# Patient Record
Sex: MALE | Race: WHITE | NOT HISPANIC OR LATINO
[De-identification: names, ages, dates, MRNs, and addresses within clinical notes are randomized per-mention and may not be internally consistent; named-entity substitution may affect disease eponyms.]

---

## 2019-10-08 ENCOUNTER — APPOINTMENT (OUTPATIENT)
Dept: ULTRASOUND IMAGING | Facility: CLINIC | Age: 44
End: 2019-10-08
Payer: COMMERCIAL

## 2019-10-08 ENCOUNTER — OUTPATIENT (OUTPATIENT)
Dept: OUTPATIENT SERVICES | Facility: HOSPITAL | Age: 44
LOS: 1 days | End: 2019-10-08
Payer: COMMERCIAL

## 2019-10-08 DIAGNOSIS — Z00.8 ENCOUNTER FOR OTHER GENERAL EXAMINATION: ICD-10-CM

## 2019-10-08 PROCEDURE — 76536 US EXAM OF HEAD AND NECK: CPT | Mod: 26

## 2019-10-08 PROCEDURE — 76775 US EXAM ABDO BACK WALL LIM: CPT | Mod: 26

## 2019-10-08 PROCEDURE — 76536 US EXAM OF HEAD AND NECK: CPT

## 2019-10-08 PROCEDURE — 76775 US EXAM ABDO BACK WALL LIM: CPT

## 2020-01-19 ENCOUNTER — EMERGENCY (EMERGENCY)
Facility: HOSPITAL | Age: 45
LOS: 1 days | Discharge: HOME | End: 2020-01-19
Attending: STUDENT IN AN ORGANIZED HEALTH CARE EDUCATION/TRAINING PROGRAM
Payer: COMMERCIAL

## 2020-01-19 VITALS
WEIGHT: 315 LBS | HEIGHT: 72 IN | SYSTOLIC BLOOD PRESSURE: 106 MMHG | HEART RATE: 75 BPM | TEMPERATURE: 98 F | DIASTOLIC BLOOD PRESSURE: 67 MMHG | RESPIRATION RATE: 20 BRPM | OXYGEN SATURATION: 100 %

## 2020-01-19 PROCEDURE — 99284 EMERGENCY DEPT VISIT MOD MDM: CPT

## 2020-01-19 NOTE — ED ADULT TRIAGE NOTE - CHIEF COMPLAINT QUOTE
patient states that he has been having SOB for a few days getting worse over the last 2 days can not lie down can not get comfortably, and diarrhea, patient speaking in full sentences in no distress lungs clear to auscultation

## 2020-01-20 VITALS
SYSTOLIC BLOOD PRESSURE: 103 MMHG | TEMPERATURE: 99 F | HEART RATE: 75 BPM | DIASTOLIC BLOOD PRESSURE: 57 MMHG | RESPIRATION RATE: 20 BRPM | OXYGEN SATURATION: 98 %

## 2020-01-20 LAB
ALBUMIN SERPL ELPH-MCNC: 4 G/DL — SIGNIFICANT CHANGE UP (ref 3.3–5.2)
ALP SERPL-CCNC: 50 U/L — SIGNIFICANT CHANGE UP (ref 40–120)
ALT FLD-CCNC: 25 U/L — SIGNIFICANT CHANGE UP
ANION GAP SERPL CALC-SCNC: 12 MMOL/L — SIGNIFICANT CHANGE UP (ref 5–17)
AST SERPL-CCNC: 21 U/L — SIGNIFICANT CHANGE UP
BILIRUB SERPL-MCNC: <0.2 MG/DL — LOW (ref 0.4–2)
BUN SERPL-MCNC: 13 MG/DL — SIGNIFICANT CHANGE UP (ref 8–20)
CALCIUM SERPL-MCNC: 9 MG/DL — SIGNIFICANT CHANGE UP (ref 8.6–10.2)
CHLORIDE SERPL-SCNC: 104 MMOL/L — SIGNIFICANT CHANGE UP (ref 98–107)
CO2 SERPL-SCNC: 23 MMOL/L — SIGNIFICANT CHANGE UP (ref 22–29)
CREAT SERPL-MCNC: 0.83 MG/DL — SIGNIFICANT CHANGE UP (ref 0.5–1.3)
D DIMER BLD IA.RAPID-MCNC: <150 NG/ML DDU — SIGNIFICANT CHANGE UP
GLUCOSE SERPL-MCNC: 115 MG/DL — SIGNIFICANT CHANGE UP (ref 70–115)
HCT VFR BLD CALC: 45.1 % — SIGNIFICANT CHANGE UP (ref 39–50)
HGB BLD-MCNC: 14.7 G/DL — SIGNIFICANT CHANGE UP (ref 13–17)
MCHC RBC-ENTMCNC: 27.1 PG — SIGNIFICANT CHANGE UP (ref 27–34)
MCHC RBC-ENTMCNC: 32.6 GM/DL — SIGNIFICANT CHANGE UP (ref 32–36)
MCV RBC AUTO: 83.1 FL — SIGNIFICANT CHANGE UP (ref 80–100)
NT-PROBNP SERPL-SCNC: 23 PG/ML — SIGNIFICANT CHANGE UP (ref 0–300)
PLATELET # BLD AUTO: 330 K/UL — SIGNIFICANT CHANGE UP (ref 150–400)
POTASSIUM SERPL-MCNC: 3.9 MMOL/L — SIGNIFICANT CHANGE UP (ref 3.5–5.3)
POTASSIUM SERPL-SCNC: 3.9 MMOL/L — SIGNIFICANT CHANGE UP (ref 3.5–5.3)
PROT SERPL-MCNC: 7.1 G/DL — SIGNIFICANT CHANGE UP (ref 6.6–8.7)
RBC # BLD: 5.43 M/UL — SIGNIFICANT CHANGE UP (ref 4.2–5.8)
RBC # FLD: 13.5 % — SIGNIFICANT CHANGE UP (ref 10.3–14.5)
SODIUM SERPL-SCNC: 139 MMOL/L — SIGNIFICANT CHANGE UP (ref 135–145)
TROPONIN T SERPL-MCNC: <0.01 NG/ML — SIGNIFICANT CHANGE UP (ref 0–0.06)
WBC # BLD: 8.62 K/UL — SIGNIFICANT CHANGE UP (ref 3.8–10.5)
WBC # FLD AUTO: 8.62 K/UL — SIGNIFICANT CHANGE UP (ref 3.8–10.5)

## 2020-01-20 PROCEDURE — 99284 EMERGENCY DEPT VISIT MOD MDM: CPT

## 2020-01-20 PROCEDURE — 36415 COLL VENOUS BLD VENIPUNCTURE: CPT

## 2020-01-20 PROCEDURE — 85379 FIBRIN DEGRADATION QUANT: CPT

## 2020-01-20 PROCEDURE — 84484 ASSAY OF TROPONIN QUANT: CPT

## 2020-01-20 PROCEDURE — 71046 X-RAY EXAM CHEST 2 VIEWS: CPT | Mod: 26

## 2020-01-20 PROCEDURE — 80053 COMPREHEN METABOLIC PANEL: CPT

## 2020-01-20 PROCEDURE — 93010 ELECTROCARDIOGRAM REPORT: CPT

## 2020-01-20 PROCEDURE — 83880 ASSAY OF NATRIURETIC PEPTIDE: CPT

## 2020-01-20 PROCEDURE — 85027 COMPLETE CBC AUTOMATED: CPT

## 2020-01-20 PROCEDURE — 71046 X-RAY EXAM CHEST 2 VIEWS: CPT

## 2020-01-20 PROCEDURE — 93005 ELECTROCARDIOGRAM TRACING: CPT

## 2020-01-20 RX ORDER — SODIUM CHLORIDE 9 MG/ML
1000 INJECTION INTRAMUSCULAR; INTRAVENOUS; SUBCUTANEOUS ONCE
Refills: 0 | Status: COMPLETED | OUTPATIENT
Start: 2020-01-20 | End: 2020-01-20

## 2020-01-20 RX ADMIN — SODIUM CHLORIDE 1000 MILLILITER(S): 9 INJECTION INTRAMUSCULAR; INTRAVENOUS; SUBCUTANEOUS at 01:45

## 2020-01-20 NOTE — ED PROVIDER NOTE - OBJECTIVE STATEMENT
43 y/o M pt, with PMHx of sleep apnea (uses CPAP) and DM, presents to the ED c/o difficulty breathing that began 1 week ago, with worse episode tonight. Pt reports an episode of difficulty breathing and lightheadedness when trying to sleep tonight, describes sensation as "breathing through a straw." He locates the feeling of overall discomfort in his chest and throat, which is worse when laying down. Pt states that since his father passed away 12/4/2018 from CVA, he has been dealing with on and off generalized feelings of discomfort. He notes that the following January, he felt worst, describing abd discomfort and CP, prompting him to go to [facility] ED, but was d/c with no significant Dx and a negative evaluation. Pt has visited multiple ED since then, but on his most recent visit, he was told he may have esophageal spasms. He states that he received an endoscopy, colonoscopy last 11/2019, checked his gallbladder function, and echocardiogram 1 week ago, all of which were negative. He thought his on and off symptoms were associated with depression and anxiety since the death of his father. However, now suspects possible other pathology, noting that his symptoms seem to worsen when his diet is full of "junk" food, but improves with a "" diet. Pt states that this most recent episode is different from those in the past. Notes occasional calf pain, describes sensation more as muscle fatigue, and weight gain over the past year. Denies coughing, recent sick contacts, or Hx of thyroid issues. Pt's Flu vaccine is UTD.

## 2020-01-20 NOTE — ED ADULT NURSE NOTE - OBJECTIVE STATEMENT
pt presents to the ed a&ox3 c/o sob that began 5 days ago, pt denies any cp or discomfort. currenlty denies any s/s of acute distress. pt safety maintained, pt on cardiac monitor, seen and eval by md richard.

## 2020-01-20 NOTE — ED PROVIDER NOTE - PATIENT PORTAL LINK FT
The patient is a 55y Male complaining of
You can access the FollowMyHealth Patient Portal offered by [facility] by registering at the following website: [email or web address]. By joining [employer]’s FollowMyHealth portal, you will also be able to view your health information using other applications (apps) compatible with our system.

## 2020-01-20 NOTE — ED PROVIDER NOTE - CLINICAL SUMMARY MEDICAL DECISION MAKING FREE TEXT BOX
Will evaluate symptoms; unclear if secondary to acute infectious process, weight, or versus other underlying issues.

## 2020-01-20 NOTE — ED PROVIDER NOTE - PROGRESS NOTE DETAILS
Pt stable, X-ray and labs noted to be normal, and will contact pt if Flu positive. Otherwise, pt is stable for discharge with outpatient followup. Patient states he feels significant better after IV hydration.

## 2020-02-25 ENCOUNTER — EMERGENCY (EMERGENCY)
Facility: HOSPITAL | Age: 45
LOS: 1 days | Discharge: HOME | End: 2020-02-25
Attending: EMERGENCY MEDICINE
Payer: COMMERCIAL

## 2020-02-25 VITALS
SYSTOLIC BLOOD PRESSURE: 131 MMHG | HEART RATE: 72 BPM | TEMPERATURE: 97 F | WEIGHT: 315 LBS | OXYGEN SATURATION: 98 % | RESPIRATION RATE: 18 BRPM | HEIGHT: 72 IN | DIASTOLIC BLOOD PRESSURE: 82 MMHG

## 2020-02-25 VITALS
OXYGEN SATURATION: 99 % | SYSTOLIC BLOOD PRESSURE: 120 MMHG | RESPIRATION RATE: 20 BRPM | DIASTOLIC BLOOD PRESSURE: 66 MMHG | HEART RATE: 72 BPM | TEMPERATURE: 98 F

## 2020-02-25 PROBLEM — Z00.00 ENCOUNTER FOR PREVENTIVE HEALTH EXAMINATION: Status: ACTIVE | Noted: 2020-02-25

## 2020-02-25 LAB
ALBUMIN SERPL ELPH-MCNC: 4.2 G/DL — SIGNIFICANT CHANGE UP (ref 3.3–5.2)
ALP SERPL-CCNC: 56 U/L — SIGNIFICANT CHANGE UP (ref 40–120)
ALT FLD-CCNC: 19 U/L — SIGNIFICANT CHANGE UP
ANION GAP SERPL CALC-SCNC: 11 MMOL/L — SIGNIFICANT CHANGE UP (ref 5–17)
AST SERPL-CCNC: 17 U/L — SIGNIFICANT CHANGE UP
BILIRUB SERPL-MCNC: 0.6 MG/DL — SIGNIFICANT CHANGE UP (ref 0.4–2)
BUN SERPL-MCNC: 14 MG/DL — SIGNIFICANT CHANGE UP (ref 8–20)
CALCIUM SERPL-MCNC: 9.2 MG/DL — SIGNIFICANT CHANGE UP (ref 8.6–10.2)
CHLORIDE SERPL-SCNC: 102 MMOL/L — SIGNIFICANT CHANGE UP (ref 98–107)
CO2 SERPL-SCNC: 26 MMOL/L — SIGNIFICANT CHANGE UP (ref 22–29)
CREAT SERPL-MCNC: 0.85 MG/DL — SIGNIFICANT CHANGE UP (ref 0.5–1.3)
D DIMER BLD IA.RAPID-MCNC: <150 NG/ML DDU — SIGNIFICANT CHANGE UP
GLUCOSE SERPL-MCNC: 103 MG/DL — HIGH (ref 70–99)
HCT VFR BLD CALC: 45 % — SIGNIFICANT CHANGE UP (ref 39–50)
HGB BLD-MCNC: 14.3 G/DL — SIGNIFICANT CHANGE UP (ref 13–17)
MCHC RBC-ENTMCNC: 26.3 PG — LOW (ref 27–34)
MCHC RBC-ENTMCNC: 31.8 GM/DL — LOW (ref 32–36)
MCV RBC AUTO: 82.9 FL — SIGNIFICANT CHANGE UP (ref 80–100)
PLATELET # BLD AUTO: 345 K/UL — SIGNIFICANT CHANGE UP (ref 150–400)
POTASSIUM SERPL-MCNC: 4.2 MMOL/L — SIGNIFICANT CHANGE UP (ref 3.5–5.3)
POTASSIUM SERPL-SCNC: 4.2 MMOL/L — SIGNIFICANT CHANGE UP (ref 3.5–5.3)
PROT SERPL-MCNC: 7.5 G/DL — SIGNIFICANT CHANGE UP (ref 6.6–8.7)
RBC # BLD: 5.43 M/UL — SIGNIFICANT CHANGE UP (ref 4.2–5.8)
RBC # FLD: 13.2 % — SIGNIFICANT CHANGE UP (ref 10.3–14.5)
SODIUM SERPL-SCNC: 139 MMOL/L — SIGNIFICANT CHANGE UP (ref 135–145)
TROPONIN T SERPL-MCNC: <0.01 NG/ML — SIGNIFICANT CHANGE UP (ref 0–0.06)
WBC # BLD: 9.24 K/UL — SIGNIFICANT CHANGE UP (ref 3.8–10.5)
WBC # FLD AUTO: 9.24 K/UL — SIGNIFICANT CHANGE UP (ref 3.8–10.5)

## 2020-02-25 PROCEDURE — 85379 FIBRIN DEGRADATION QUANT: CPT

## 2020-02-25 PROCEDURE — 71046 X-RAY EXAM CHEST 2 VIEWS: CPT | Mod: 26

## 2020-02-25 PROCEDURE — 93010 ELECTROCARDIOGRAM REPORT: CPT

## 2020-02-25 PROCEDURE — 36415 COLL VENOUS BLD VENIPUNCTURE: CPT

## 2020-02-25 PROCEDURE — 80053 COMPREHEN METABOLIC PANEL: CPT

## 2020-02-25 PROCEDURE — 84484 ASSAY OF TROPONIN QUANT: CPT

## 2020-02-25 PROCEDURE — 93005 ELECTROCARDIOGRAM TRACING: CPT

## 2020-02-25 PROCEDURE — 85027 COMPLETE CBC AUTOMATED: CPT

## 2020-02-25 PROCEDURE — 99284 EMERGENCY DEPT VISIT MOD MDM: CPT | Mod: 25

## 2020-02-25 PROCEDURE — 71046 X-RAY EXAM CHEST 2 VIEWS: CPT

## 2020-02-25 PROCEDURE — 99285 EMERGENCY DEPT VISIT HI MDM: CPT

## 2020-02-25 RX ORDER — OMEPRAZOLE 10 MG/1
1 CAPSULE, DELAYED RELEASE ORAL
Qty: 0 | Refills: 0 | DISCHARGE

## 2020-02-25 RX ORDER — SODIUM CHLORIDE 9 MG/ML
1000 INJECTION INTRAMUSCULAR; INTRAVENOUS; SUBCUTANEOUS ONCE
Refills: 0 | Status: COMPLETED | OUTPATIENT
Start: 2020-02-25 | End: 2020-02-25

## 2020-02-25 RX ADMIN — SODIUM CHLORIDE 1000 MILLILITER(S): 9 INJECTION INTRAMUSCULAR; INTRAVENOUS; SUBCUTANEOUS at 09:11

## 2020-02-25 RX ADMIN — SODIUM CHLORIDE 1000 MILLILITER(S): 9 INJECTION INTRAMUSCULAR; INTRAVENOUS; SUBCUTANEOUS at 09:24

## 2020-02-25 NOTE — ED ADULT TRIAGE NOTE - CHIEF COMPLAINT QUOTE
generalized weakness, dizziness, lightheadedness, abd pain, and chest pressure, pt states "I fell in the bathroom saturday and went to my PMD's office." following outpt for symptoms "But something just doesn't feel right."

## 2020-02-25 NOTE — CONSULT NOTE ADULT - SUBJECTIVE AND OBJECTIVE BOX
[facility]                                                               Office:  [address] [address]                                                              Telephone: [phone]. Fax:[phone]                                                                        CARDIOLOGY CONSULTATION NOTE                                                                                             Consult requested by:  Dr. Martinez  Reason for Consultation: Syncope  History obtained by: Patient and medical record   obtained: No    Chief complaint:    Patient is a 44y old  Male who presents with a chief complaint of syncope      HPI:  43yo male with PMH obesity, GERD, and prior syncopal events.  Patient states that Saturday around 0430 he woke up to urinate, felt slightly dizzy upon standing, after urinating (while still standing in front of toilet) he passed out for several seconds, asymptomatic when awoke.  Patient was evaluated by PMD Dr. Pérez later that day and was advised likely vasovagal.  Today while driving to work states had similar prodrome and pulled over without passing out and came to ED for further eval.  over the last few years this has occurred 2 other times.  Both prior events occurred during illness such as GI virus w/n/v/d and the flu while he was sitting on the toilet having a BM.  He report that last week he was not ill but had friends in from out of town and had been eating out (high salt), drinking some alcohol, and not enough water.  He states that he has had recent testing with Dr. Pérez (EKGs, TTEs, and cardiac CTA calcium score) all of which is nml (reports being sent).  Denies chest pain/pressure, palpitations, irregular and/or rapid heart beat, SOB, ANGUIANO, orthopnea, PND, cough, edema, n/v/d, hematuria, or hematochezia.       REVIEW OF SYMPTOMS:     CONSTITUTIONAL: No fever, weight loss, or fatigue  ENMT:  No difficulty hearing, tinnitus, vertigo; No sinus or throat pain  NECK: No pain or stiffness  CARDIOVASCULAR: per HPI  RESPIRATORY: No Dyspnea on exertion, Shortness of breath, cough, wheezing  : No dysuria, no hematuria   GI: No dark color stool, no melena, no diarrhea, no constipation, no abdominal pain   NEURO: No headache, no dizziness, no slurred speech   MUSCULOSKELETAL: No joint pain or swelling; No muscle, back, or extremity pain  PSYCH: No agitation, no anxiety.    ALL OTHER REVIEW OF SYSTEMS ARE NEGATIVE.      PREVIOUS DIAGNOSTIC TESTING  none documented      ALLERGIES: Allergies    tetracycline (Hives)    Intolerances          PAST MEDICAL HISTORY  GERD (gastroesophageal reflux disease)      PAST SURGICAL HISTORY  No significant past surgical history      FAMILY HISTORY:      SOCIAL HISTORY:    CIGARETTES:   denies  ALCOHOL: rare  DRUGS: denies      CURRENT MEDICATIONS:           HOME MEDICATIONS:  Omeprazole    Vital Signs Last 24 Hrs  T(C): 36.3 (25 Feb 2020 06:29), Max: 36.3 (25 Feb 2020 06:29)  T(F): 97.4 (25 Feb 2020 06:29), Max: 97.4 (25 Feb 2020 06:29)  HR: 72 (25 Feb 2020 06:29) (72 - 72)  BP: 131/82 (25 Feb 2020 06:29) (131/82 - 131/82)  BP(mean): --  RR: 18 (25 Feb 2020 07:32) (18 - 18)  SpO2: 98% (25 Feb 2020 07:32) (98% - 98%)      PHYSICAL EXAM:  Constitutional: Comfortable. No acute distress.   HEENT: Atraumatic and normocephalic, neck is supple. No JVD. No carotid bruit. PEERL   CNS: A&Ox3. No focal deficits. EOMI. Cranial nerves II-IX are intact.   Lymph Nodes: Cervical: Not palpable.  Respiratory: CTAB  Cardiovascular: S1S2 RRR. No murmur/rubs or gallop.  Gastrointestinal: Soft non-tender and non distended. +Bowel sounds. Negative Hutson's sign.  Extremities: No edema.   Psychiatric: Calm. No agitation.  Skin: No skin rash/ulcers visualized to face, hands or feet.    Intake and output:     LABS:                        14.3   9.24  )-----------( 345      ( 25 Feb 2020 07:38 )             45.0     02-25    139  |  102  |  14.0  ----------------------------<  103<H>  4.2   |  26.0  |  0.85    Ca    9.2      25 Feb 2020 07:38    TPro  7.5  /  Alb  4.2  /  TBili  0.6  /  DBili  x   /  AST  17  /  ALT  19  /  AlkPhos  56  02-25    CARDIAC MARKERS ( 25 Feb 2020 07:38 )  x     / <0.01 ng/mL / x     / x     / x        ;p-BNP=        INTERPRETATION OF TELEMETRY: Sinus rhythm 60s, no events   ECG: NSR 68bpm    RADIOLOGY & ADDITIONAL STUDIES:    X-ray:    < from: Xray Chest 2 Views PA/Lat (02.25.20 @ 07:41) >  FINDINGS:    The airway is midline.  Thereare no airspace consolidations.  There is no pleural effusion or pneumothorax.   The heart size is within the limits of normal.   The visualized osseus structures are intact.     IMPRESSION:    No acute radiographic cardiopulmonary pathology.     < end of copied text >      CT scan:     MRI: [facility]                                                               Office:  [address] [address]                                                              Telephone: [phone]. Fax:[phone]                                                                        CARDIOLOGY CONSULTATION NOTE                                                                                             Consult requested by:  Dr. Martinez  Reason for Consultation: Syncope  History obtained by: Patient and medical record   obtained: No    Chief complaint:    Patient is a 44y old  Male who presents with a chief complaint of syncope      HPI:  43yo male with PMH obesity, GERD, and prior syncopal events.  Patient states that Saturday around 0430 he woke up to urinate, felt slightly dizzy upon standing, after urinating (while still standing in front of toilet) he passed out for several seconds, asymptomatic when awoke.  Patient was evaluated by PMD Dr. Pérez later that day and was advised likely vasovagal.  Today while driving to work states had similar prodrome and pulled over without passing out and came to ED for further eval.  over the last few years this has occurred 2 other times.  Both prior events occurred during illness such as GI virus w/n/v/d and the flu while he was sitting on the toilet having a BM.  He report that last week he was not ill but had friends in from out of town and had been eating out (high salt), drinking some alcohol, and not enough water.  He states that he has had recent testing with Dr. Pérez (EKGs, TTEs, and cardiac CTA calcium score) all of which is nml (reports being sent).  Denies chest pain/pressure, palpitations, irregular and/or rapid heart beat, SOB, ANGUIANO, orthopnea, PND, cough, edema, n/v/d, hematuria, or hematochezia.       REVIEW OF SYMPTOMS:     CONSTITUTIONAL: No fever, weight loss, or fatigue  ENMT:  No difficulty hearing, tinnitus, vertigo; No sinus or throat pain  NECK: No pain or stiffness  CARDIOVASCULAR: per HPI  RESPIRATORY: No Dyspnea on exertion, Shortness of breath, cough, wheezing  : No dysuria, no hematuria   GI: No dark color stool, no melena, no diarrhea, no constipation, no abdominal pain   NEURO: No headache, no dizziness, no slurred speech   MUSCULOSKELETAL: No joint pain or swelling; No muscle, back, or extremity pain  PSYCH: No agitation, no anxiety.    ALL OTHER REVIEW OF SYSTEMS ARE NEGATIVE.      PREVIOUS DIAGNOSTIC TESTING  none documented      ALLERGIES: Allergies    tetracycline (Hives)    PAST MEDICAL HISTORY  GERD (gastroesophageal reflux disease)      PAST SURGICAL HISTORY  No significant past surgical history      FAMILY HISTORY:      SOCIAL HISTORY:    CIGARETTES:   denies  ALCOHOL: rare  DRUGS: denies      HOME MEDICATIONS:  Omeprazole    Vital Signs Last 24 Hrs  T(C): 36.3 (25 Feb 2020 06:29), Max: 36.3 (25 Feb 2020 06:29)  T(F): 97.4 (25 Feb 2020 06:29), Max: 97.4 (25 Feb 2020 06:29)  HR: 72 (25 Feb 2020 06:29) (72 - 72)  BP: 131/82 (25 Feb 2020 06:29) (131/82 - 131/82)  BP(mean): --  RR: 18 (25 Feb 2020 07:32) (18 - 18)  SpO2: 98% (25 Feb 2020 07:32) (98% - 98%)      PHYSICAL EXAM:  Constitutional: Comfortable. No acute distress.   HEENT: Atraumatic and normocephalic, neck is supple. No JVD. No carotid bruit. PEERL   CNS: A&Ox3. No focal deficits. EOMI. Cranial nerves II-IX are intact.   Lymph Nodes: Cervical: Not palpable.  Respiratory: CTAB  Cardiovascular: S1S2 RRR. No murmur/rubs or gallop.  Gastrointestinal: Soft non-tender and non distended. +Bowel sounds. Negative Hutson's sign.  Extremities: No edema.   Psychiatric: Calm. No agitation.  Skin: No skin rash/ulcers visualized to face, hands or feet.    Intake and output:     LABS:                        14.3   9.24  )-----------( 345      ( 25 Feb 2020 07:38 )             45.0     02-25    139  |  102  |  14.0  ----------------------------<  103<H>  4.2   |  26.0  |  0.85    Ca    9.2      25 Feb 2020 07:38    TPro  7.5  /  Alb  4.2  /  TBili  0.6  /  DBili  x   /  AST  17  /  ALT  19  /  AlkPhos  56  02-25    CARDIAC MARKERS ( 25 Feb 2020 07:38 )  x     / <0.01 ng/mL / x     / x     / x        ;p-BNP=        INTERPRETATION OF TELEMETRY: Sinus rhythm 60s, no events   ECG: NSR 68bpm    RADIOLOGY & ADDITIONAL STUDIES:    X-ray:    < from: Xray Chest 2 Views PA/Lat (02.25.20 @ 07:41) >  FINDINGS:    The airway is midline.  Thereare no airspace consolidations.  There is no pleural effusion or pneumothorax.   The heart size is within the limits of normal.   The visualized osseus structures are intact.     IMPRESSION:    No acute radiographic cardiopulmonary pathology.     < end of copied text >      CT scan:     MRI:

## 2020-02-25 NOTE — ED PROVIDER NOTE - CARE PROVIDER_API CALL
Kaleb Cottrell)  Cardiovascular Disease  [address] [facility], [place] [address]  Phone: [phone]  Fax: [phone]  Follow Up Time:

## 2020-02-25 NOTE — CONSULT NOTE ADULT - ASSESSMENT
A/P:  45yo male with PMH obesity, GERD, and prior syncopal events.  Patient states that Saturday around 0430 he woke up to urinate, felt slightly dizzy upon standing, after urinating (while still standing in front of toilet) he passed out for several seconds, asymptomatic when awoke.  Patient was evaluated by PMD Dr. Pérez later that day and was advised likely vasovagal.  Today while driving to work states had similar prodrome and pulled over without passing out and came to ED for further eval.  over the last few years this has occurred 2 other times.  Both prior events occurred during illness such as GI virus w/n/v/d and the flu while he was sitting on the toilet having a BM.  He report that last week he was not ill but had friends in from out of town and had been eating out (high salt), drinking some alcohol, and not enough water.  He states that he has had recent testing with Dr. Pérez (EKGs, TTEs, and cardiac CTA calcium score) all of which is nml (reports being sent).      1. Syncope  - labs wnl  - EKG w/o ischemia or arrhythmia  - likely vasovagal  - recommend increase hydration, avoid caffeine and alcohol  - f/u o/p for event monitor    Preliminary evaluation, please await complete evaluation by Dr. Cottrell A/P:  45yo male with PMH obesity, GERD, and prior syncopal events.  Patient states that Saturday around 0430 he woke up to urinate, felt slightly dizzy upon standing, after urinating (while still standing in front of toilet) he passed out for several seconds, asymptomatic when awoke.  Patient was evaluated by PMD Dr. Pérez later that day and was advised likely vasovagal.  Today while driving to work states had similar prodrome and pulled over without passing out and came to ED for further eval.  over the last few years this has occurred 2 other times.  Both prior events occurred during illness such as GI virus w/n/v/d and the flu while he was sitting on the toilet having a BM.  He report that last week he was not ill but had friends in from out of town and had been eating out (high salt), drinking some alcohol, and not enough water.  He states that he has had recent testing with Dr. Pérez (EKGs, TTEs, and cardiac CTA calcium score) all of which is nml (reports being sent).      1. Syncope  - labs wnl  - EKG w/o ischemia or arrhythmia  - orthostatics nml  - likely vasovagal  - recommend increase hydration, avoid caffeine and alcohol. If symptoms occur advised to lay down and elevate legs.  - f/u o/p for event monitor and tilt table test

## 2020-02-25 NOTE — ED PROVIDER NOTE - CLINICAL SUMMARY MEDICAL DECISION MAKING FREE TEXT BOX
Pt is a 43 y/o M presents c/o lightheadedness and dizziness currently asymptomatic, will get cxr, trop, labs, orthostatic reassess

## 2020-02-25 NOTE — ED PROVIDER NOTE - PHYSICAL EXAMINATION
Constitutional: obese male, comfortable, lying in bed.  HEENT: normocephalic, atraumatic, conjunctiva pink without scleral icterus, EOMI.  Neck supple, no masses, thyroid not palpable, trachea midline, no LAD  Cardiovascular: RRR, S1/S2 present, no MRG.  No JVD, peripheral pulses 2+, no edema, cap refill <2 sec  Respiratory: CTAB, no increased work of breathing, no wheezes, rales, rhonchi.  Abdomen: Bowel sounds present, soft, NT/ ND.  No rebound, guarding.  No hepatosplenomegaly appreciated.  No CVA tenderness, no deformities appreciated, no para spinal tenderness  MSK: no edema, cyanosis, clubbing.  Skin: warm, dry, no lesions noted  Neuro: CN 2-12 grossly intact, no focal deficits.  Psych: Alert and oriented to self, place, time

## 2020-02-25 NOTE — ED PROVIDER NOTE - NS ED ROS FT
CONSTITUTIONAL: No fevers, no chills  Eyes: No vision changes  Cardiovascular: +Chest pain  Respiratory: +SOB  Gastrointestinal: No n/v, +diarrhea, +abd pain  Genitourinary: no dysuria, no hematuria  SKIN: no rashes.  NEURO: no headache, no weakness or numbness  PSYCHIATRIC: anxiety, depression

## 2020-02-25 NOTE — ED PROVIDER NOTE - PROGRESS NOTE DETAILS
Pt feeling better, states he took BuSpar Friday morning for anxiety. Spoke with cardiology, rec f/u outpt for additional testing (event monitor, tilt table).

## 2020-02-25 NOTE — ED PROVIDER NOTE - ATTENDING CONTRIBUTION TO CARE
Dr. Umaña : I have personally seen and examined this patient at the bedside. I have fully participated in the care of this patient. I have reviewed all pertinent clinical information, including history, physical exam, plan and the Resident's note and agree except as noted.       45 y/o M presents c/o near syncope this morning while driving to work. notes that got into his car felt fine when was driving felt lightheaded so pulled over and then started to feel midsternal chest pressure mild sob nausea, and felt sweaty. notes a syncopal episode on saturday - woke up felt dizzy went to urinate and passed out during micturition. went to [facility] had nnormal work up and ekg at that time. hx of syncope in a setting of dehydration 1 year ago but not since then until this reacent episode. notes increased stress since father passed away from colon ca 1 year ago, with intermittend gi symptoms of esophageal spasm and abd discomfort normal work up by endoscopy. has not seen a cardiologist for this. currently feels better but still feels a little lightheaded no spinning sensation. sometimes gets intermittent palpitations.     Denies f/c/v//palpitations/cough/abd.pain/d/c/dysuria/hematuria. no sick contacts/recent travel. no hx of dvt/pe    PE:  head; atraumatic normocephalic  eyes: perrla  Heart: rrr s1s2  lungs: ctab  abd: soft, nt nd + bs no rebound/guarding no cva ttp  le: no swelling no calf ttp  back: no midline cervical/thoracic/lumbar ttp  neuro: aao x 3 cn iii-xii intact    -->most likely vasovagal vs stress related; however notes chest pressure with episode this time will fu labs hydrate pt consult cardiology --reassess

## 2020-02-25 NOTE — CONSULT NOTE ADULT - ATTENDING COMMENTS
Mr. Rios was seen and examined. Plan of care dw pt and NP in length.  Pt with multiple episodes of syncope. 2 of the episodes occurred when he was sick and was defecating. Last week, standing up to urinate in the middle of the night made his pass out. There is no EKG changes. Records from PMD was also reviewed. TTE, carotid duplex ans holter results were reviewed. He is not orthostatic on examination. EKG with normal QTc as well. no hx of palpitations. No fhx of SCD or syncope.   We spoke about vasovagal syncope. Advised to keep well hydrated as well. Plan for tilt table test as outpt and event monitor for 2-3 weeks as outpt.   He is advised to keep his feet elevated if not ambulating specially if he has a cold or is dehydrated.

## 2020-02-25 NOTE — ED PROVIDER NOTE - OBJECTIVE STATEMENT
Pt is a 43 y/o M presents c/o "driving to work and feeling lightheaded and dizzy."  Pt states that four days ago he got up around 0430 and passed out while urinating.  His wife heard him fall and found him on the floor of the bathroom against the door.  He decide to wait to go to his primary care doctor for evaluation and was sent home with an unremarkable w/u.  He states that for the last 2 years, since his father passed away from colon cx, he has not felt like himself.  He has had an endoscopy and colonoscopy, both unremarkable.  He was told he may suffer from esophageal spasms and he states he is very anxious about the symptoms associated with the episodes.  He has also followed with outpatient therapy, but states that this has not improved his well being.

## 2020-02-25 NOTE — ED PROVIDER NOTE - PATIENT PORTAL LINK FT
You can access the FollowMyHealth Patient Portal offered by [facility] by registering at the following website: [email or web address]. By joining [employer]’s FollowMyHealth portal, you will also be able to view your health information using other applications (apps) compatible with our system.

## 2020-02-25 NOTE — ED ADULT NURSE NOTE - NSIMPLEMENTINTERV_GEN_ALL_ED
Implemented All Universal Safety Interventions:  [place] to call system. Call bell, personal items and telephone within reach. Instruct patient to call for assistance. Room bathroom lighting operational. Non-slip footwear when patient is off stretcher. Physically safe environment: no spills, clutter or unnecessary equipment. Stretcher in lowest position, wheels locked, appropriate side rails in place.

## 2020-02-27 ENCOUNTER — NON-APPOINTMENT (OUTPATIENT)
Age: 45
End: 2020-02-27

## 2020-02-27 ENCOUNTER — APPOINTMENT (OUTPATIENT)
Dept: CARDIOLOGY | Facility: CLINIC | Age: 45
End: 2020-02-27
Payer: COMMERCIAL

## 2020-02-27 VITALS — DIASTOLIC BLOOD PRESSURE: 76 MMHG | SYSTOLIC BLOOD PRESSURE: 102 MMHG

## 2020-02-27 VITALS
WEIGHT: 315 LBS | HEART RATE: 81 BPM | BODY MASS INDEX: 42.66 KG/M2 | HEIGHT: 72 IN | SYSTOLIC BLOOD PRESSURE: 110 MMHG | OXYGEN SATURATION: 97 % | DIASTOLIC BLOOD PRESSURE: 68 MMHG

## 2020-02-27 DIAGNOSIS — Z86.39 PERSONAL HISTORY OF OTHER ENDOCRINE, NUTRITIONAL AND METABOLIC DISEASE: ICD-10-CM

## 2020-02-27 DIAGNOSIS — Z82.49 FAMILY HISTORY OF ISCHEMIC HEART DISEASE AND OTHER DISEASES OF THE CIRCULATORY SYSTEM: ICD-10-CM

## 2020-02-27 DIAGNOSIS — Z87.898 PERSONAL HISTORY OF OTHER SPECIFIED CONDITIONS: ICD-10-CM

## 2020-02-27 DIAGNOSIS — Z78.9 OTHER SPECIFIED HEALTH STATUS: ICD-10-CM

## 2020-02-27 DIAGNOSIS — Z87.19 PERSONAL HISTORY OF OTHER DISEASES OF THE DIGESTIVE SYSTEM: ICD-10-CM

## 2020-02-27 PROBLEM — K21.9 GASTRO-ESOPHAGEAL REFLUX DISEASE WITHOUT ESOPHAGITIS: Chronic | Status: ACTIVE | Noted: 2020-02-25

## 2020-02-27 PROCEDURE — 99214 OFFICE O/P EST MOD 30 MIN: CPT | Mod: 25

## 2020-02-27 PROCEDURE — 93000 ELECTROCARDIOGRAM COMPLETE: CPT

## 2020-03-11 ENCOUNTER — APPOINTMENT (OUTPATIENT)
Dept: CARDIOLOGY | Facility: CLINIC | Age: 45
End: 2020-03-11
Payer: COMMERCIAL

## 2020-03-11 PROCEDURE — 93306 TTE W/DOPPLER COMPLETE: CPT

## 2020-04-30 ENCOUNTER — NON-APPOINTMENT (OUTPATIENT)
Age: 45
End: 2020-04-30

## 2020-04-30 ENCOUNTER — APPOINTMENT (OUTPATIENT)
Dept: CARDIOLOGY | Facility: CLINIC | Age: 45
End: 2020-04-30
Payer: COMMERCIAL

## 2020-04-30 VITALS
WEIGHT: 315 LBS | HEART RATE: 89 BPM | HEIGHT: 72 IN | BODY MASS INDEX: 42.66 KG/M2 | OXYGEN SATURATION: 97 % | SYSTOLIC BLOOD PRESSURE: 106 MMHG | DIASTOLIC BLOOD PRESSURE: 69 MMHG | TEMPERATURE: 98.5 F

## 2020-04-30 PROCEDURE — 93000 ELECTROCARDIOGRAM COMPLETE: CPT

## 2020-04-30 PROCEDURE — 99214 OFFICE O/P EST MOD 30 MIN: CPT

## 2020-07-10 ENCOUNTER — APPOINTMENT (OUTPATIENT)
Dept: CARDIOLOGY | Facility: CLINIC | Age: 45
End: 2020-07-10
Payer: COMMERCIAL

## 2020-07-10 PROCEDURE — 93015 CV STRESS TEST SUPVJ I&R: CPT

## 2020-09-17 DIAGNOSIS — Z01.818 ENCOUNTER FOR OTHER PREPROCEDURAL EXAMINATION: ICD-10-CM

## 2020-09-19 ENCOUNTER — APPOINTMENT (OUTPATIENT)
Dept: DISASTER EMERGENCY | Facility: CLINIC | Age: 45
End: 2020-09-19

## 2020-09-20 LAB — SARS-COV-2 N GENE NPH QL NAA+PROBE: NOT DETECTED

## 2021-02-25 ENCOUNTER — EMERGENCY (EMERGENCY)
Facility: HOSPITAL | Age: 46
LOS: 1 days | Discharge: HOME | End: 2021-02-25
Attending: STUDENT IN AN ORGANIZED HEALTH CARE EDUCATION/TRAINING PROGRAM
Payer: COMMERCIAL

## 2021-02-25 VITALS
RESPIRATION RATE: 18 BRPM | HEIGHT: 72 IN | SYSTOLIC BLOOD PRESSURE: 133 MMHG | HEART RATE: 85 BPM | DIASTOLIC BLOOD PRESSURE: 74 MMHG | TEMPERATURE: 98 F | OXYGEN SATURATION: 98 % | WEIGHT: 315 LBS

## 2021-02-25 PROCEDURE — 99283 EMERGENCY DEPT VISIT LOW MDM: CPT | Mod: 25

## 2021-02-25 PROCEDURE — 96372 THER/PROPH/DIAG INJ SC/IM: CPT

## 2021-02-25 PROCEDURE — 93010 ELECTROCARDIOGRAM REPORT: CPT

## 2021-02-25 PROCEDURE — 93005 ELECTROCARDIOGRAM TRACING: CPT

## 2021-02-25 PROCEDURE — 99284 EMERGENCY DEPT VISIT MOD MDM: CPT

## 2021-02-25 RX ORDER — GABAPENTIN 400 MG/1
300 CAPSULE ORAL ONCE
Refills: 0 | Status: COMPLETED | OUTPATIENT
Start: 2021-02-25 | End: 2021-02-25

## 2021-02-25 RX ORDER — KETOROLAC TROMETHAMINE 30 MG/ML
30 SYRINGE (ML) INJECTION ONCE
Refills: 0 | Status: DISCONTINUED | OUTPATIENT
Start: 2021-02-25 | End: 2021-02-25

## 2021-02-25 RX ORDER — GABAPENTIN 400 MG/1
1 CAPSULE ORAL
Qty: 14 | Refills: 0
Start: 2021-02-25 | End: 2021-03-01

## 2021-02-25 RX ADMIN — Medication 30 MILLIGRAM(S): at 01:11

## 2021-02-25 RX ADMIN — GABAPENTIN 300 MILLIGRAM(S): 400 CAPSULE ORAL at 01:11

## 2021-02-25 RX ADMIN — Medication 60 MILLIGRAM(S): at 01:11

## 2021-02-25 NOTE — ED PROVIDER NOTE - OBJECTIVE STATEMENT
45 year old male with no PMHx presents to  ED for neck pain radiating down the L arm x 2 weeks. Pt reports he has been shoveling snow and also works as an . Denies CP, SOB, palpitations, trauma to arm. Went to urgent care, reports they prescribed him and muscle relaxant and gave him steroid shot; pt believes steroid shot helped but does not report relief of pain with muscle relaxant.

## 2021-02-25 NOTE — ED PROVIDER NOTE - ATTENDING CONTRIBUTION TO CARE
Post-Anesthesia Evaluation and Assessment    Patient: Venessa Vargas MRN: [id number]  SSN: [id number]    YOB: 1957  Age: 63 y.o. Sex: male       Cardiovascular Function/Vital Signs  Visit Vitals    /90 (BP 1 Location: Right arm, BP Patient Position: At rest)    Pulse 67    Temp 36.3 °C (97.4 °F)    Resp 14    Ht 5' 7\" (1.702 m)    Wt 66.9 kg (147 lb 7.8 oz)    SpO2 96%    BMI 23.1 kg/m2       Patient is status post general anesthesia for * No procedures listed *. Nausea/Vomiting: None    Postoperative hydration reviewed and adequate. Pain:  Pain Scale 1: Numeric (0 - 10) (03/30/18 1030)  Pain Intensity 1: 0 (03/30/18 1030)   Managed    Neurological Status:   Neuro (WDL): Exceptions to WDL (03/30/18 0957)  Neuro  Neurologic State: Alert;Drowsy; Eyes open spontaneously (03/30/18 0957)  Orientation Level: Oriented to person;Oriented to place;Oriented to situation (03/30/18 0957)  Cognition: Follows commands (03/30/18 0957)  Speech: Clear (03/30/18 0957)  LUE Motor Response: Spontaneous  (03/30/18 0957)  LLE Motor Response: Spontaneous  (03/30/18 0957)  RUE Motor Response: Spontaneous  (03/30/18 0957)  RLE Motor Response: Spontaneous  (03/30/18 0957)   At baseline    Mental Status and Level of Consciousness: Arousable    Pulmonary Status:   O2 Device: Room air (03/30/18 1030)   Adequate oxygenation and airway patent    Complications related to anesthesia: None    Post-anesthesia assessment completed.  No concerns    Signed By: Edmund Emerson MD     March 30, 2018 44yo male with no pmh presents with neck pain down the left arm for 2 weeks, got worse for the past 2 days. Pt states he has been working, shoveling, repetitive movements, no weakness, no chest pain. Pt states the pain is mainly at night when he lays on his flexed left arm to that side. Pt denies fevers/chills, ha, loc, focal neuro deficits, saddle anesthesia, bowel/bladder incontinence, cp/sob/palp, cough, abd pain/n/v/d, urinary symptoms, recent travel and sick contacts.  Const: Awake, alert and oriented. In no acute distress. Well appearing.  HEENT: NC/AT. Moist mucous membranes.  Eyes: No scleral icterus. EOMI.  Neck:. Soft and supple. Full ROM without pain.  Cardiac: Regular rate and regular rhythm. +S1/S2. Peripheral pulses 2+ and symmetric. No LE edema.  Resp: Speaking in full sentences. No evidence of respiratory distress. No wheezes, rales or rhonchi.  Abd: Soft, non-tender, non-distended. Normal bowel sounds in all 4 quadrants. No guarding or rebound.  Back: Spine midline and non-tender. No CVAT.  Skin: No rashes, abrasions or lacerations.  Lymph: No cervical lymphadenopathy.  Neuro: Awake, alert & oriented x 3. Moves all extremities symmetrically.  pt likely with radiculopathy, improved with meds, follow up with spine

## 2021-02-25 NOTE — ED PROVIDER NOTE - CLINICAL SUMMARY MEDICAL DECISION MAKING FREE TEXT BOX
45 year old male p/w neck pain radiating down the L arm which started 2 weeks ago. Normal neuro and PEx of L shoulder. Likely cervical radiculopathy. Will treat with nsaids, steroids and gabapentin and dc with spine center for follow up.

## 2021-02-25 NOTE — ED PROVIDER NOTE - NSFOLLOWUPINSTRUCTIONS_ED_ALL_ED_FT
Take medications as prescribed   Follow up with spine specialist within 1 week     Cervical Radiculopathy       Cervical radiculopathy happens when a nerve in the neck (a cervical nerve) is pinched or bruised. This condition can happen because of an injury to the cervical spine (vertebrae) in the neck, or as part of the normal aging process. Pressure on the cervical nerves can cause pain or numbness that travels from the neck all the way down into the arm and fingers. Usually, this condition gets better with rest. Treatment may be needed if the condition does not improve.      What are the causes?  This condition may be caused by:  •A neck injury.  •A bulging (herniated) disk.  •Muscle spasms.  •Muscle tightness in the neck because of overuse.  •Arthritis.    •Breakdown or degeneration in the bones and joints of the spine (spondylosis) due to aging.    •Bone spurs that may develop near the cervical nerves.      What are the signs or symptoms?  Symptoms of this condition include:  •Pain. The pain may travel from the neck to the arm and hand. The pain can be severe or irritating. It may be worse when you move your neck.    •Numbness or tingling in your arm or hand.  •Weakness in the affected arm and hand, in severe cases.      How is this diagnosed?  This condition may be diagnosed based on your symptoms, your medical history, and a physical exam. You may also have tests, including:  •X-rays.    •A CT scan.  •An MRI.  •An electromyogram (EMG).  •Nerve conduction tests.    How is this treated?  In many cases, treatment is not needed for this condition. With rest, the condition usually gets better over time. If treatment is needed, options may include:  •Wearing a soft neck collar (cervical collar) for short periods of time, as told by your health care provider.    •Doing physical therapy to strengthen your neck muscles.    •Taking medicines, such as NSAIDs or oral corticosteroids.    •Having spinal injections, in severe cases.    •Having surgery. This may be needed if other treatments do not help. Different types of surgery may be done depending on the cause of this condition.    Follow these instructions at home:    If you have a cervical collar:     •Wear it as told by your health care provider. Remove it only as told by your health care provider.    •Ask your health care provider if you can remove the collar for cleaning and bathing. If you are allowed to remove the collar for cleaning or bathing:  •Follow instructions from your health care provider about how to remove the collar safely.    •Clean the collar by wiping it with mild soap and water and drying it completely.    •Take out any removable pads in the collar every 1–2 days, and wash them by hand with soap and water. Let them air-dry completely before you put them back in the collar.    •Check your skin under the collar for irritation or sores. If you see any, tell your health care provider.    Managing pain    •Take over-the-counter and prescription medicines only as told by your health care provider.    •If directed, put ice on the affected area.  •If you have a soft neck collar, remove it as told by your health care provider.    •Put ice in a plastic bag.    •Place a towel between your skin and the bag.    •Leave the ice on for 20 minutes, 2–3 times a day.    •If applying ice does not help, you can try using heat. Use the heat source that your health care provider recommends, such as a moist heat pack or a heating pad.  •Place a towel between your skin and the heat source.    •Leave the heat on for 20–30 minutes.    •Remove the heat if your skin turns bright red. This is especially important if you are unable to feel pain, heat, or cold. You may have a greater risk of getting burned.      •Try a gentle neck and shoulder massage to help relieve symptoms.      Activity   •Rest as needed.    •Return to your normal activities as told by your health care provider. Ask your health care provider what activities are safe for you.    •Do stretching and strengthening exercises as told by your health care provider or physical therapist.    • Do not lift anything that is heavier than 10 lb (4.5 kg) until your health care provider tells you that it is safe.    General instructions     •Use a flat pillow when you sleep.    • Do not drive while wearing a cervical collar. If you do not have a cervical collar, ask your health care provider if it is safe to drive while your neck heals.    •Ask your health care provider if the medicine prescribed to you requires you to avoid driving or using heavy machinery.    • Do not use any products that contain nicotine or tobacco, such as cigarettes, e-cigarettes, and chewing tobacco. These can delay healing. If you need help quitting, ask your health care provider.    •Keep all follow-up visits as told by your health care provider. This is important.    Contact a health care provider if:    •Your condition does not improve with treatment.    Get help right away if:    •Your pain gets much worse and cannot be controlled with medicines.    •You have weakness or numbness in your hand, arm, face, or leg    •You have a high fever.    •You have a stiff, rigid neck.    •You lose control of your bowels or your bladder (have incontinence).    •You have trouble with walking, balance, or speaking.      Summary    •Cervical radiculopathy happens when a nerve in the neck is pinched or bruised.    •A nerve can get pinched from a bulging disk, arthritis, muscle spasms, or an injury to the neck.    •Symptoms include pain, tingling, or numbness radiating from the neck into the arm or hand. Weakness can also occur in severe cases.    •Treatment may include rest, wearing a cervical collar, and physical therapy. Medicines may be prescribed to help with pain. In severe cases, injections or surgery may be needed.

## 2021-02-25 NOTE — ED ADULT TRIAGE NOTE - AS TEMP SITE
Shannon comes today for treatment of infection.  She denies any adverse reactions to medication.  
oral

## 2021-02-25 NOTE — ED PROVIDER NOTE - NEUROLOGICAL, MLM
Alert and oriented, no focal deficits, no motor or sensory deficits, intact  strength b/l, nl sensation to all digits of L hand

## 2021-02-25 NOTE — ED ADULT TRIAGE NOTE - CHIEF COMPLAINT QUOTE
Patient is alert and oriented x3 c/o left arm pain radiating to neck and left scapula xfew days progressively worsening. denies trauma to the area. denies shortness of breath.

## 2021-02-25 NOTE — ED PROVIDER NOTE - CARE PROVIDER_API CALL
Valdez Lovelace (DO)  Orthopaedic Surgery  [address] [place] [address], [address]  [place] [address]  Phone: [phone]  Fax: [phone]  Follow Up Time:

## 2021-02-25 NOTE — ED PROVIDER NOTE - PATIENT PORTAL LINK FT
You can access the FollowMyHealth Patient Portal offered by [facility] by registering at the following website: [email or web address]. By joining [employer]’s FollowMyHealth portal, you will also be able to view your health information using other applications (apps) compatible with our system.

## 2021-04-18 ENCOUNTER — EMERGENCY (EMERGENCY)
Facility: HOSPITAL | Age: 46
LOS: 1 days | Discharge: HOME | End: 2021-04-18
Attending: EMERGENCY MEDICINE
Payer: COMMERCIAL

## 2021-04-18 VITALS
HEART RATE: 84 BPM | RESPIRATION RATE: 20 BRPM | OXYGEN SATURATION: 98 % | DIASTOLIC BLOOD PRESSURE: 60 MMHG | SYSTOLIC BLOOD PRESSURE: 110 MMHG

## 2021-04-18 VITALS
OXYGEN SATURATION: 98 % | WEIGHT: 300.05 LBS | TEMPERATURE: 98 F | HEART RATE: 92 BPM | DIASTOLIC BLOOD PRESSURE: 83 MMHG | RESPIRATION RATE: 20 BRPM | HEIGHT: 72 IN | SYSTOLIC BLOOD PRESSURE: 124 MMHG

## 2021-04-18 LAB
ALBUMIN SERPL ELPH-MCNC: 3.7 G/DL — SIGNIFICANT CHANGE UP (ref 3.3–5.2)
ALP SERPL-CCNC: 58 U/L — SIGNIFICANT CHANGE UP (ref 40–120)
ALT FLD-CCNC: 24 U/L — SIGNIFICANT CHANGE UP
ANION GAP SERPL CALC-SCNC: 11 MMOL/L — SIGNIFICANT CHANGE UP (ref 5–17)
AST SERPL-CCNC: 17 U/L — SIGNIFICANT CHANGE UP
BASOPHILS # BLD AUTO: 0.04 K/UL — SIGNIFICANT CHANGE UP (ref 0–0.2)
BASOPHILS NFR BLD AUTO: 0.4 % — SIGNIFICANT CHANGE UP (ref 0–2)
BILIRUB SERPL-MCNC: <0.2 MG/DL — LOW (ref 0.4–2)
BUN SERPL-MCNC: 14 MG/DL — SIGNIFICANT CHANGE UP (ref 8–20)
CALCIUM SERPL-MCNC: 8.9 MG/DL — SIGNIFICANT CHANGE UP (ref 8.6–10.2)
CHLORIDE SERPL-SCNC: 105 MMOL/L — SIGNIFICANT CHANGE UP (ref 98–107)
CO2 SERPL-SCNC: 25 MMOL/L — SIGNIFICANT CHANGE UP (ref 22–29)
CREAT SERPL-MCNC: 0.83 MG/DL — SIGNIFICANT CHANGE UP (ref 0.5–1.3)
EOSINOPHIL # BLD AUTO: 0.15 K/UL — SIGNIFICANT CHANGE UP (ref 0–0.5)
EOSINOPHIL NFR BLD AUTO: 1.3 % — SIGNIFICANT CHANGE UP (ref 0–6)
ETHANOL SERPL-MCNC: <10 MG/DL — SIGNIFICANT CHANGE UP (ref 0–9)
GLUCOSE SERPL-MCNC: 110 MG/DL — HIGH (ref 70–99)
HCT VFR BLD CALC: 43 % — SIGNIFICANT CHANGE UP (ref 39–50)
HGB BLD-MCNC: 14.1 G/DL — SIGNIFICANT CHANGE UP (ref 13–17)
IMM GRANULOCYTES NFR BLD AUTO: 0.4 % — SIGNIFICANT CHANGE UP (ref 0–1.5)
LYMPHOCYTES # BLD AUTO: 2.55 K/UL — SIGNIFICANT CHANGE UP (ref 1–3.3)
LYMPHOCYTES # BLD AUTO: 22.4 % — SIGNIFICANT CHANGE UP (ref 13–44)
MCHC RBC-ENTMCNC: 27.5 PG — SIGNIFICANT CHANGE UP (ref 27–34)
MCHC RBC-ENTMCNC: 32.8 GM/DL — SIGNIFICANT CHANGE UP (ref 32–36)
MCV RBC AUTO: 83.8 FL — SIGNIFICANT CHANGE UP (ref 80–100)
MONOCYTES # BLD AUTO: 0.95 K/UL — HIGH (ref 0–0.9)
MONOCYTES NFR BLD AUTO: 8.4 % — SIGNIFICANT CHANGE UP (ref 2–14)
NEUTROPHILS # BLD AUTO: 7.62 K/UL — HIGH (ref 1.8–7.4)
NEUTROPHILS NFR BLD AUTO: 67.1 % — SIGNIFICANT CHANGE UP (ref 43–77)
PLATELET # BLD AUTO: 379 K/UL — SIGNIFICANT CHANGE UP (ref 150–400)
POTASSIUM SERPL-MCNC: 4.3 MMOL/L — SIGNIFICANT CHANGE UP (ref 3.5–5.3)
POTASSIUM SERPL-SCNC: 4.3 MMOL/L — SIGNIFICANT CHANGE UP (ref 3.5–5.3)
PROT SERPL-MCNC: 7 G/DL — SIGNIFICANT CHANGE UP (ref 6.6–8.7)
RBC # BLD: 5.13 M/UL — SIGNIFICANT CHANGE UP (ref 4.2–5.8)
RBC # FLD: 13.7 % — SIGNIFICANT CHANGE UP (ref 10.3–14.5)
SODIUM SERPL-SCNC: 141 MMOL/L — SIGNIFICANT CHANGE UP (ref 135–145)
TROPONIN T SERPL-MCNC: <0.01 NG/ML — SIGNIFICANT CHANGE UP (ref 0–0.06)
TROPONIN T SERPL-MCNC: <0.01 NG/ML — SIGNIFICANT CHANGE UP (ref 0–0.06)
WBC # BLD: 11.36 K/UL — HIGH (ref 3.8–10.5)
WBC # FLD AUTO: 11.36 K/UL — HIGH (ref 3.8–10.5)

## 2021-04-18 PROCEDURE — 85025 COMPLETE CBC W/AUTO DIFF WBC: CPT

## 2021-04-18 PROCEDURE — 93005 ELECTROCARDIOGRAM TRACING: CPT

## 2021-04-18 PROCEDURE — 96361 HYDRATE IV INFUSION ADD-ON: CPT

## 2021-04-18 PROCEDURE — 99285 EMERGENCY DEPT VISIT HI MDM: CPT

## 2021-04-18 PROCEDURE — 80307 DRUG TEST PRSMV CHEM ANLYZR: CPT

## 2021-04-18 PROCEDURE — 80053 COMPREHEN METABOLIC PANEL: CPT

## 2021-04-18 PROCEDURE — 93010 ELECTROCARDIOGRAM REPORT: CPT

## 2021-04-18 PROCEDURE — 36415 COLL VENOUS BLD VENIPUNCTURE: CPT

## 2021-04-18 PROCEDURE — 99285 EMERGENCY DEPT VISIT HI MDM: CPT | Mod: 25

## 2021-04-18 PROCEDURE — 84484 ASSAY OF TROPONIN QUANT: CPT

## 2021-04-18 PROCEDURE — 96374 THER/PROPH/DIAG INJ IV PUSH: CPT

## 2021-04-18 RX ORDER — ONDANSETRON 8 MG/1
4 TABLET, FILM COATED ORAL ONCE
Refills: 0 | Status: COMPLETED | OUTPATIENT
Start: 2021-04-18 | End: 2021-04-18

## 2021-04-18 RX ORDER — SODIUM CHLORIDE 9 MG/ML
3 INJECTION INTRAMUSCULAR; INTRAVENOUS; SUBCUTANEOUS ONCE
Refills: 0 | Status: COMPLETED | OUTPATIENT
Start: 2021-04-18 | End: 2021-04-18

## 2021-04-18 RX ORDER — SODIUM CHLORIDE 9 MG/ML
1000 INJECTION INTRAMUSCULAR; INTRAVENOUS; SUBCUTANEOUS
Refills: 0 | Status: DISCONTINUED | OUTPATIENT
Start: 2021-04-18 | End: 2021-04-22

## 2021-04-18 RX ADMIN — SODIUM CHLORIDE 1000 MILLILITER(S): 9 INJECTION INTRAMUSCULAR; INTRAVENOUS; SUBCUTANEOUS at 10:46

## 2021-04-18 RX ADMIN — SODIUM CHLORIDE 100 MILLILITER(S): 9 INJECTION INTRAMUSCULAR; INTRAVENOUS; SUBCUTANEOUS at 09:51

## 2021-04-18 RX ADMIN — ONDANSETRON 4 MILLIGRAM(S): 8 TABLET, FILM COATED ORAL at 09:51

## 2021-04-18 RX ADMIN — SODIUM CHLORIDE 3 MILLILITER(S): 9 INJECTION INTRAMUSCULAR; INTRAVENOUS; SUBCUTANEOUS at 09:52

## 2021-04-18 NOTE — ED ADULT TRIAGE NOTE - CHIEF COMPLAINT QUOTE
"I feel lightheaded, N.V and chest discomfort" pt drank some alcohol and an edible last night, pt woke up with symptoms denies passing out but felt very dizzy.

## 2021-04-18 NOTE — ED PROVIDER NOTE - CLINICAL SUMMARY MEDICAL DECISION MAKING FREE TEXT BOX
46 yo male presents not feeling well, as if he was going to pass out, after drinking 7 beers in five hours and then taking an edible.  pe as documented  iv monitor ekg labs reassess  10:32 am all labs normal ekg unremarkable  symptoms likely due to alcohol and edible  will repeat trop try po challenge and if neg can d.c.

## 2021-04-18 NOTE — ED PROVIDER NOTE - NSFOLLOWUPINSTRUCTIONS_ED_ALL_ED_FT
Home and rest  Stay well hydrated  Consider moderating your alcohol intake  Return for any problems or changes on your condition, otherwise follow up with your primary care physician

## 2021-04-18 NOTE — ED PROVIDER NOTE - OBJECTIVE STATEMENT
Pt is a 46 yo male with c/o nausea and feeling as if he was going to pass out on three separate episodes since early this morning. Pt states from approximately 5 pm to 11 pm he had 7 beers and then had "an edible". He went to bed and fell asleep. This am when he woke he went to get up and "did not feel right " and felt like if he got oob he would pass out. Pt states he laid back down for a while and had the same experience 2 more times. Episodes associated with nausea and "not feeling well". + nausea and some sweating with episodes. Hx syncope one year ago. Had evaluation including echo and stress and was found to have occ. PVCs. Has been well until this morning.  Med hx syncope  social hx  social drinking

## 2021-04-18 NOTE — ED ADULT NURSE NOTE - NSIMPLEMENTINTERV_GEN_ALL_ED
Implemented All Universal Safety Interventions:  [place] to call system. Call bell, personal items and telephone within reach. Instruct patient to call for assistance. Room bathroom lighting operational. Non-slip footwear when patient is off stretcher. Physically safe environment: no spills, clutter or unnecessary equipment. Stretcher in lowest position, wheels locked, appropriate side rails in place.

## 2021-04-18 NOTE — ED ADULT NURSE NOTE - NS PRO AD NO ADVANCE DIRECTIVE
We want to give the best care possible. If you receive a [employer] Ganey survey from our office, please take the time to fill out the survey and return it in the envelope provided. Your feedback helps us know how we are doing and we really appreciate it.Thank you.    
No

## 2021-04-18 NOTE — ED ADULT NURSE NOTE - OBJECTIVE STATEMENT
pt comes to ED with reports of feeling dizzy with some chest pressure in the middle of the night. pt reports he had a few beers and at 10pm took a marijuana edible for the first time. a few hours later he "didn't feel right and got nervous." pt with no medical history, no current pain, no SOB no cardiac hx. denies nausea/vomiting currently, TORRES with strength and purpose, no numbness/tingling noted. pt with no neuro deficits. pt states he just had neck surgery 3 weeks ago and wants to make sure he is okay. pt had no symptoms at all prior to eating said edible.

## 2021-04-18 NOTE — ED PROVIDER NOTE - PATIENT PORTAL LINK FT
You can access the FollowMyHealth Patient Portal offered by [facility] by registering at the following website: [email or web address]. By joining [employer]’s FollowMyHealth portal, you will also be able to view your health information using other applications (apps) compatible with our system.

## 2021-06-02 NOTE — ED ADULT NURSE NOTE - ISOLATION TYPE:
Refuse refill for coreg and Plavix. Pt has not seen cardiologist in over 1 year. Contact PCP.     None

## 2021-06-22 NOTE — ED ADULT NURSE NOTE - PAIN RATING/NUMBER SCALE (0-10): REST
Patient is due for health maintenance. Last Physical done 8/24/18. Left voicemail for patient to call and schedule an appointment.    0

## 2021-10-25 ENCOUNTER — APPOINTMENT (OUTPATIENT)
Dept: CARDIOLOGY | Facility: CLINIC | Age: 46
End: 2021-10-25
Payer: COMMERCIAL

## 2021-10-25 ENCOUNTER — NON-APPOINTMENT (OUTPATIENT)
Age: 46
End: 2021-10-25

## 2021-10-25 VITALS
DIASTOLIC BLOOD PRESSURE: 76 MMHG | WEIGHT: 315 LBS | HEART RATE: 92 BPM | BODY MASS INDEX: 42.66 KG/M2 | OXYGEN SATURATION: 98 % | HEIGHT: 72 IN | TEMPERATURE: 98.2 F | SYSTOLIC BLOOD PRESSURE: 122 MMHG

## 2021-10-25 DIAGNOSIS — R55 SYNCOPE AND COLLAPSE: ICD-10-CM

## 2021-10-25 DIAGNOSIS — R00.2 PALPITATIONS: ICD-10-CM

## 2021-10-25 DIAGNOSIS — I49.3 VENTRICULAR PREMATURE DEPOLARIZATION: ICD-10-CM

## 2021-10-25 PROCEDURE — 99213 OFFICE O/P EST LOW 20 MIN: CPT

## 2021-10-25 PROCEDURE — 93000 ELECTROCARDIOGRAM COMPLETE: CPT

## 2021-10-25 RX ORDER — FLUTICASONE PROPIONATE 50 UG/1
50 SPRAY, METERED NASAL
Qty: 16 | Refills: 0 | Status: DISCONTINUED | COMMUNITY
Start: 2021-05-05

## 2021-10-25 RX ORDER — EPINEPHRINE 0.3 MG/.3ML
0.3 INJECTION INTRAMUSCULAR
Qty: 2 | Refills: 0 | Status: ACTIVE | COMMUNITY
Start: 2021-05-05

## 2021-10-25 RX ORDER — OMEPRAZOLE 40 MG/1
40 CAPSULE, DELAYED RELEASE ORAL
Qty: 30 | Refills: 0 | Status: ACTIVE | COMMUNITY
Start: 2021-10-20

## 2021-10-25 RX ORDER — PHENTERMINE HYDROCHLORIDE 15 MG/1
15 CAPSULE ORAL
Qty: 30 | Refills: 0 | Status: DISCONTINUED | COMMUNITY
Start: 2021-06-25

## 2021-10-25 RX ORDER — PHENTERMINE HYDROCHLORIDE 30 MG/1
30 CAPSULE ORAL
Qty: 30 | Refills: 0 | Status: DISCONTINUED | COMMUNITY
Start: 2021-07-23

## 2021-10-25 RX ORDER — PHENTERMINE HYDROCHLORIDE 37.5 MG/1
37.5 CAPSULE ORAL
Qty: 30 | Refills: 0 | Status: ACTIVE | COMMUNITY
Start: 2021-09-03

## 2021-10-25 RX ORDER — OMEPRAZOLE 20 MG/1
20 CAPSULE, DELAYED RELEASE ORAL
Refills: 0 | Status: DISCONTINUED | COMMUNITY
End: 2021-10-25

## 2021-10-31 PROBLEM — R00.2 PALPITATIONS: Status: ACTIVE | Noted: 2020-02-28

## 2021-10-31 PROBLEM — R55 SYNCOPE: Status: ACTIVE | Noted: 2020-02-27

## 2021-10-31 PROBLEM — I49.3 PVC (PREMATURE VENTRICULAR CONTRACTION): Status: ACTIVE | Noted: 2020-04-02

## 2021-10-31 NOTE — ASSESSMENT
[FreeTextEntry1] : No more syncope or presyncope\par Cardiac workup thus far negative.\par BP to goal\par Trying to keep weight stable with phentermine \par Patient was advised to partake in 150 minutes of moderate exercise per week.\par Vertigo, FU wiuth pmd. \par PVCS, rare, worst with caffeine intake. Can also be more frequent with diet pills.\par pt knows to contact us right away with any nw symptoms of cp, sob, worsening palpitiaotns.\par Patient was advised to see us in 1 year if no new symptoms or earlier with any change in symptoms.\par \par \par

## 2021-10-31 NOTE — REVIEW OF SYSTEMS
[Headache] : no headache [Weight Gain (___ Lbs)] : [unfilled] ~Ulb weight gain [Feeling Fatigued] : not feeling fatigued [Blurry Vision] : no blurred vision [Seeing Double (Diplopia)] : no diplopia [Palpitations] : palpitations [Urinary Frequency] : no change in urinary frequency [Negative] : Neurological

## 2021-10-31 NOTE — HISTORY OF PRESENT ILLNESS
[FreeTextEntry1] : 45 yo male seen with vertigo and palpitations. \par Hx of LORENA on CPAP with good sleep. No more syncope or presyncope. \par Cardiac CTTA no calcium. \par anterior discectomy, did well . \par he had vertigo when he was in bed supine, moved to get phone plug. He had motion sickness\par Had MRI of head in 2019: No schwannoma. \par Hx of PVCs.\par He is active, no cp or sob with exertion .\par stress test 202 negative for ischemia \par Caffeinated drinks worsen it.\par Using phentermine for weight loss, good results. \par \par EKG: NSR, normal axis and intervals, no st/t changes

## 2022-10-19 ENCOUNTER — EMERGENCY (EMERGENCY)
Facility: HOSPITAL | Age: 47
LOS: 1 days | Discharge: HOME | End: 2022-10-19
Attending: STUDENT IN AN ORGANIZED HEALTH CARE EDUCATION/TRAINING PROGRAM
Payer: COMMERCIAL

## 2022-10-19 VITALS
RESPIRATION RATE: 16 BRPM | OXYGEN SATURATION: 96 % | SYSTOLIC BLOOD PRESSURE: 148 MMHG | HEIGHT: 72 IN | TEMPERATURE: 98 F | HEART RATE: 87 BPM | DIASTOLIC BLOOD PRESSURE: 86 MMHG

## 2022-10-19 PROCEDURE — 99284 EMERGENCY DEPT VISIT MOD MDM: CPT

## 2022-10-19 PROCEDURE — 99053 MED SERV 10PM-8AM 24 HR FAC: CPT

## 2022-10-19 PROCEDURE — 96372 THER/PROPH/DIAG INJ SC/IM: CPT

## 2022-10-19 RX ORDER — LIDOCAINE 4 G/100G
1 CREAM TOPICAL ONCE
Refills: 0 | Status: COMPLETED | OUTPATIENT
Start: 2022-10-19 | End: 2022-10-19

## 2022-10-19 RX ORDER — IBUPROFEN 200 MG
1 TABLET ORAL
Qty: 15 | Refills: 0
Start: 2022-10-19 | End: 2022-10-23

## 2022-10-19 RX ORDER — KETOROLAC TROMETHAMINE 30 MG/ML
15 SYRINGE (ML) INJECTION ONCE
Refills: 0 | Status: DISCONTINUED | OUTPATIENT
Start: 2022-10-19 | End: 2022-10-19

## 2022-10-19 RX ORDER — METHOCARBAMOL 500 MG/1
1 TABLET, FILM COATED ORAL
Qty: 15 | Refills: 0
Start: 2022-10-19 | End: 2022-10-23

## 2022-10-19 RX ORDER — KETOROLAC TROMETHAMINE 30 MG/ML
30 SYRINGE (ML) INJECTION ONCE
Refills: 0 | Status: DISCONTINUED | OUTPATIENT
Start: 2022-10-19 | End: 2022-10-19

## 2022-10-19 RX ORDER — LIDOCAINE 4 G/100G
1 CREAM TOPICAL
Qty: 5 | Refills: 0
Start: 2022-10-19 | End: 2022-10-23

## 2022-10-19 RX ADMIN — LIDOCAINE 1 PATCH: 4 CREAM TOPICAL at 06:37

## 2022-10-19 RX ADMIN — Medication 30 MILLIGRAM(S): at 06:37

## 2022-10-19 RX ADMIN — Medication 60 MILLIGRAM(S): at 06:38

## 2022-10-19 NOTE — ED PROVIDER NOTE - NS ED ATTENDING STATEMENT MOD
This was a shared visit with the GULSHAN. I reviewed and verified the documentation and independently performed the documented:

## 2022-10-19 NOTE — ED ADULT TRIAGE NOTE - CHIEF COMPLAINT QUOTE
Pt. complaining of back pain. Pt. states he bucked his leg yesterday at work. Pt. states his lower back is hurting and going down his left leg. Pt. took oxycottin last night without relief. Ambulatory in ED.

## 2022-10-19 NOTE — ED PROVIDER NOTE - PATIENT PORTAL LINK FT
You can access the FollowMyHealth Patient Portal offered by [facility] by registering at the following website: [email or web address]. By joining [employer]’s FollowMyHealth portal, you will also be able to view your health information using other applications (apps) compatible with our system.

## 2022-10-19 NOTE — ED PROVIDER NOTE - CARE PROVIDER_API CALL
Pb Churchill; PhD)  Neurosurgery  [address] [place] [address]  Phone: [phone]  Fax: [phone]  Follow Up Time:

## 2022-10-19 NOTE — ED PROVIDER NOTE - NSFOLLOWUPINSTRUCTIONS_ED_ALL_ED_FT
Follow up with orthopedist within 1 week.   Continue pain medications as prescribed.   Return for any worsening symptoms.     Back pain is very common in adults. The cause of back pain is rarely dangerous and the pain often gets better over time. The cause of your back pain may not be known and may include strain of muscles or ligaments, degeneration of the spinal disks, or arthritis. Occasionally the pain may radiate down your leg(s). Over-the-counter medicines to reduce pain and inflammation are often the most helpful. Stretching and remaining active frequently helps the healing process.     SEEK IMMEDIATE MEDICAL CARE IF YOU HAVE ANY OF THE FOLLOWING SYMPTOMS: bowel or bladder control problems, unusual weakness or numbness in your arms or legs, nausea or vomiting, abdominal pain, fever, dizziness/lightheadedness.

## 2022-10-19 NOTE — ED PROVIDER NOTE - OBJECTIVE STATEMENT
47M with PMH occasional back pain and cervical radiculopathy presenting with left sided LBP radiating to left LE. 47M with PMH occasional back pain and cervical radiculopathy presenting with left sided LBP radiating to left LE. 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 That night he began having worsening left sided back pain.   Denies saddle anesthesia, loss of bowel or bladder function, or change in sensation.

## 2022-10-19 NOTE — ED ADULT NURSE NOTE - OBJECTIVE STATEMENT
assumed care of pt from triage, pt AAOX4, resp. even and unlabored, pt c/o 7/10 lower back pain, pain radiated down the hip and leg, pt denies any injury, neg. deformity or bruising as per pt, neg. chest pain/SOB, able to ambulate w/normal gait,

## 2022-10-19 NOTE — ED PROVIDER NOTE - CLINICAL SUMMARY MEDICAL DECISION MAKING FREE TEXT BOX
left sided sciatica. rx for robaxin, prednisone, ibuprofen, lidocaine patch. Pt to FU with his orthopedist who did his cervical discectomy. Discussed ED return precautions. left sided sciatica. rx for robaxin, prednisone, ibuprofen, lidocaine patch. Pt to FU with his orthopedist who did his cervical discectomy. Pain improved. Discussed ED return precautions.
